# Patient Record
Sex: FEMALE
[De-identification: names, ages, dates, MRNs, and addresses within clinical notes are randomized per-mention and may not be internally consistent; named-entity substitution may affect disease eponyms.]

---

## 2023-06-05 PROBLEM — Z00.00 ENCOUNTER FOR PREVENTIVE HEALTH EXAMINATION: Status: ACTIVE | Noted: 2023-06-05

## 2023-06-13 ENCOUNTER — APPOINTMENT (OUTPATIENT)
Dept: ENDOCRINOLOGY | Facility: CLINIC | Age: 68
End: 2023-06-13
Payer: COMMERCIAL

## 2023-06-13 VITALS
HEIGHT: 66.5 IN | BODY MASS INDEX: 27.16 KG/M2 | DIASTOLIC BLOOD PRESSURE: 72 MMHG | SYSTOLIC BLOOD PRESSURE: 118 MMHG | HEART RATE: 83 BPM | WEIGHT: 171 LBS

## 2023-06-13 DIAGNOSIS — Z98.84 BARIATRIC SURGERY STATUS: ICD-10-CM

## 2023-06-13 DIAGNOSIS — I10 ESSENTIAL (PRIMARY) HYPERTENSION: ICD-10-CM

## 2023-06-13 DIAGNOSIS — E66.3 OVERWEIGHT: ICD-10-CM

## 2023-06-13 PROCEDURE — 99204 OFFICE O/P NEW MOD 45 MIN: CPT

## 2023-06-13 RX ORDER — LISINOPRIL AND HYDROCHLOROTHIAZIDE TABLETS 20; 25 MG/1; MG/1
20-25 TABLET ORAL
Refills: 0 | Status: ACTIVE | COMMUNITY
Start: 2023-06-13

## 2023-06-13 RX ORDER — LEVOTHYROXINE SODIUM 100 UG/1
100 TABLET ORAL
Qty: 90 | Refills: 3 | Status: ACTIVE | COMMUNITY
Start: 2023-06-13 | End: 1900-01-01

## 2023-06-13 RX ORDER — SEMAGLUTIDE 0.25 MG/.5ML
0.25 INJECTION, SOLUTION SUBCUTANEOUS
Qty: 1 | Refills: 0 | Status: ACTIVE | COMMUNITY
Start: 2023-06-13 | End: 1900-01-01

## 2023-06-13 NOTE — ASSESSMENT
[FreeTextEntry1] : 1. Hypothyroidism\par 2/2 Hashimotos, per patient\par Current thyroid regimen: Synthroid (brand) 100mcg QAM\par Most recent TFTs with PCP reveal TSH at goal of 1.44 with mildly elevated FT4 (2.02)\par Has been on current dose for ~3 years\par Discussed that mild T4 elevation can be due to her adherence to her Synthroid regimen, will continue current dose at this time, but plan to repeat TFTs in 3 months, especially as weight loss/maintenance of weight loss is desires and weight-based nature of LT4 supplementation.\par -- continue Synthroid 100mcg QAM\par -- repeat TFTs in 3 months, especially if resuming GLP1ra\par \par 2. Obesity\par Currently BMI is in overweight category at 27.19\par H/o surgical management with bariatric surgery in 2003 with resultant loss of 120lb.  Per patient starting weight 300lb.\par H/o multiple agents for medical management of obesity (as above), most recently, wegovy, which she tolerated well with therapeutic benefit.  Since discontinuation, has regained 10lbs.\par + obesity related comorbidity of hypertension\par She is fearful to continue to gain weight and would prefer to resume wegovy to achieve ~10lb further weight loss and to maintain her weight loss that she has achieved over the years\par -- RX for wegovy sent to meds, discussed shortage.  She is open to trial of saxenda, if wegovy unable to be obtained\par \par Follow-up in 3 months, if resuming GLP1ra, labs with this visit\par \par Debbi Jim, MS, FNP-BC, [facility]\par 06/13/2023

## 2023-06-13 NOTE — HISTORY OF PRESENT ILLNESS
[FreeTextEntry1] : Ms. GONSALEZ is a 67 year female with pmhx of hypothyroidism (dx in 1998), obesity (s/p gastric bypass in 2003) who presents for thyroid and weight management evaluation.\par \par Previously following Endocrinologist at [facility] x 30year, most recently seeing Dr. Burnham.\par Diagnosed with /hypothyroidism in 1998, endorses +hashimotos as etiology\par \par \par Current regimen: Synthroid (brand) 100 mcg daily, which she has been on this regimen x~3 years\par -- She takes the medication on an empty stomach - 1 hour apart from other food/medications.\par \par \par Denies fatigue\par Endorses +weight gain of 10lb since stopping wegovy in early 2023\par Denies constipation, diarrhea\par Denies hair loss\par Denies SOB on exertion\par Denies palpitation\par \par + family history of thyroid disease (mother, maternal aunt, maternal cousin)\par Denies a personal history of radiation exposure\par \par \par 1. Obesity\par Current BMI is 27.19\par Highest weight, pre-bariatric surgery was 300lb, BMI 47.7\par Lost 120lb with bariatric surgery (2003)\par Was medically managing obesity with past endocrinologist\par She has obesity related comorbidity of hypertension.\par Previously on wegovy regimen, stopped r/t last endocrinologist no longer being in-network for patients insurance\par Lose weight, lowest 161 (achieved while on wegovy), has regained 10lb since stopping this regimen 6 months ago\par Was on for short duration of time, 3 months, got to 1/1.7mg regimen\par \par Past pharmacologic therapy:\par - belviq, d/c with recall\par - wegovy \par - Xenical, GI distress\par \par H/o surgical management (gastric bypass) in 2003. \par Max weight 300lb, lost 120lb

## 2023-06-13 NOTE — PHYSICAL EXAM
[Alert] : alert [No Acute Distress] : no acute distress [Normal Voice/Communication] : normal voice communication [Normal Hearing] : hearing was normal [No Neck Mass] : no neck mass was observed [No LAD] : no lymphadenopathy [Thyroid Not Enlarged] : the thyroid was not enlarged [No Thyroid Nodules] : no palpable thyroid nodules [No Respiratory Distress] : no respiratory distress [Normal Rate and Effort] : normal respiratory rate and effort [Clear to Auscultation] : lungs were clear to auscultation bilaterally [Normal Rate] : heart rate was normal [Regular Rhythm] : with a regular rhythm [No Stigmata of Cushings Syndrome] : no stigmata of Cushings Syndrome [Normal Gait] : normal gait [Oriented x3] : oriented to person, place, and time [Normal Affect] : the affect was normal [Normal Insight/Judgement] : insight and judgment were intact [Normal Mood] : the mood was normal

## 2023-06-13 NOTE — DATA REVIEWED
[FreeTextEntry1] : 6/2/2023\par Glu 87, creat 0.8, eGFR 81.7, AST/ALT 30/30\par Chol 181 , HDL 87, LDL 85, TG 97\par WBC 6.1, RBC 4.03, H&H 10.3/34.3, plt 299\par TSH 1.44, FT4 2.02, TT4 12.9\par A1C 5.5%

## 2023-06-14 RX ORDER — LIRAGLUTIDE 6 MG/ML
18 INJECTION, SOLUTION SUBCUTANEOUS
Qty: 3 | Refills: 1 | Status: ACTIVE | COMMUNITY
Start: 2023-06-14 | End: 1900-01-01

## 2023-06-29 ENCOUNTER — NON-APPOINTMENT (OUTPATIENT)
Age: 68
End: 2023-06-29

## 2023-06-29 RX ORDER — PEN NEEDLE, DIABETIC 32GX 5/32"
32G X 4 MM NEEDLE, DISPOSABLE MISCELLANEOUS
Qty: 100 | Refills: 0 | Status: ACTIVE | COMMUNITY
Start: 2023-06-29 | End: 1900-01-01

## 2024-03-14 DIAGNOSIS — Z13.1 ENCOUNTER FOR SCREENING FOR DIABETES MELLITUS: ICD-10-CM

## 2024-03-14 DIAGNOSIS — E06.3 AUTOIMMUNE THYROIDITIS: ICD-10-CM

## 2024-03-14 DIAGNOSIS — Z13.220 ENCOUNTER FOR SCREENING FOR LIPOID DISORDERS: ICD-10-CM

## 2024-05-17 ENCOUNTER — APPOINTMENT (OUTPATIENT)
Dept: ENDOCRINOLOGY | Facility: CLINIC | Age: 69
End: 2024-05-17

## 2024-07-08 ENCOUNTER — APPOINTMENT (OUTPATIENT)
Dept: ENDOCRINOLOGY | Facility: CLINIC | Age: 69
End: 2024-07-08

## 2025-02-24 ENCOUNTER — EMERGENCY (EMERGENCY)
Facility: HOSPITAL | Age: 70
LOS: 1 days | Discharge: HOME | End: 2025-02-24
Admitting: EMERGENCY MEDICINE
Payer: COMMERCIAL

## 2025-02-24 VITALS
RESPIRATION RATE: 17 BRPM | SYSTOLIC BLOOD PRESSURE: 117 MMHG | WEIGHT: 171.08 LBS | DIASTOLIC BLOOD PRESSURE: 74 MMHG | HEART RATE: 74 BPM | TEMPERATURE: 98 F | OXYGEN SATURATION: 98 %

## 2025-02-24 VITALS
RESPIRATION RATE: 16 BRPM | TEMPERATURE: 98 F | DIASTOLIC BLOOD PRESSURE: 67 MMHG | HEART RATE: 73 BPM | SYSTOLIC BLOOD PRESSURE: 142 MMHG | OXYGEN SATURATION: 100 %

## 2025-02-24 LAB
ALBUMIN SERPL ELPH-MCNC: 3.8 G/DL — SIGNIFICANT CHANGE UP (ref 3.3–5)
ALP SERPL-CCNC: 122 U/L — HIGH (ref 40–120)
ALT FLD-CCNC: 20 U/L — SIGNIFICANT CHANGE UP (ref 4–33)
ANION GAP SERPL CALC-SCNC: 13 MMOL/L — SIGNIFICANT CHANGE UP (ref 7–14)
AST SERPL-CCNC: 25 U/L — SIGNIFICANT CHANGE UP (ref 4–32)
BASOPHILS # BLD AUTO: 0.05 K/UL — SIGNIFICANT CHANGE UP (ref 0–0.2)
BASOPHILS NFR BLD AUTO: 0.6 % — SIGNIFICANT CHANGE UP (ref 0–2)
BILIRUB SERPL-MCNC: <0.2 MG/DL — SIGNIFICANT CHANGE UP (ref 0.2–1.2)
BUN SERPL-MCNC: 28 MG/DL — HIGH (ref 7–23)
CALCIUM SERPL-MCNC: 9.1 MG/DL — SIGNIFICANT CHANGE UP (ref 8.4–10.5)
CHLORIDE SERPL-SCNC: 106 MMOL/L — SIGNIFICANT CHANGE UP (ref 98–107)
CO2 SERPL-SCNC: 23 MMOL/L — SIGNIFICANT CHANGE UP (ref 22–31)
CREAT SERPL-MCNC: 0.7 MG/DL — SIGNIFICANT CHANGE UP (ref 0.5–1.3)
EGFR: 95 ML/MIN/1.73M2 — SIGNIFICANT CHANGE UP
EOSINOPHIL # BLD AUTO: 0.19 K/UL — SIGNIFICANT CHANGE UP (ref 0–0.5)
EOSINOPHIL NFR BLD AUTO: 2.1 % — SIGNIFICANT CHANGE UP (ref 0–6)
GLUCOSE SERPL-MCNC: 96 MG/DL — SIGNIFICANT CHANGE UP (ref 70–99)
HCT VFR BLD CALC: 40.9 % — SIGNIFICANT CHANGE UP (ref 34.5–45)
HGB BLD-MCNC: 13.2 G/DL — SIGNIFICANT CHANGE UP (ref 11.5–15.5)
IANC: 6.54 K/UL — SIGNIFICANT CHANGE UP (ref 1.8–7.4)
IMM GRANULOCYTES NFR BLD AUTO: 0.3 % — SIGNIFICANT CHANGE UP (ref 0–0.9)
LYMPHOCYTES # BLD AUTO: 1.51 K/UL — SIGNIFICANT CHANGE UP (ref 1–3.3)
LYMPHOCYTES # BLD AUTO: 16.9 % — SIGNIFICANT CHANGE UP (ref 13–44)
MCHC RBC-ENTMCNC: 30.1 PG — SIGNIFICANT CHANGE UP (ref 27–34)
MCHC RBC-ENTMCNC: 32.3 G/DL — SIGNIFICANT CHANGE UP (ref 32–36)
MCV RBC AUTO: 93.2 FL — SIGNIFICANT CHANGE UP (ref 80–100)
MONOCYTES # BLD AUTO: 0.6 K/UL — SIGNIFICANT CHANGE UP (ref 0–0.9)
MONOCYTES NFR BLD AUTO: 6.7 % — SIGNIFICANT CHANGE UP (ref 2–14)
NEUTROPHILS # BLD AUTO: 6.54 K/UL — SIGNIFICANT CHANGE UP (ref 1.8–7.4)
NEUTROPHILS NFR BLD AUTO: 73.4 % — SIGNIFICANT CHANGE UP (ref 43–77)
NRBC # BLD AUTO: 0 K/UL — SIGNIFICANT CHANGE UP (ref 0–0)
NRBC # FLD: 0 K/UL — SIGNIFICANT CHANGE UP (ref 0–0)
NRBC BLD AUTO-RTO: 0 /100 WBCS — SIGNIFICANT CHANGE UP (ref 0–0)
PLATELET # BLD AUTO: 218 K/UL — SIGNIFICANT CHANGE UP (ref 150–400)
POTASSIUM SERPL-MCNC: 4.8 MMOL/L — SIGNIFICANT CHANGE UP (ref 3.5–5.3)
POTASSIUM SERPL-SCNC: 4.8 MMOL/L — SIGNIFICANT CHANGE UP (ref 3.5–5.3)
PROT SERPL-MCNC: 7.1 G/DL — SIGNIFICANT CHANGE UP (ref 6–8.3)
RBC # BLD: 4.39 M/UL — SIGNIFICANT CHANGE UP (ref 3.8–5.2)
RBC # FLD: 12.7 % — SIGNIFICANT CHANGE UP (ref 10.3–14.5)
SODIUM SERPL-SCNC: 142 MMOL/L — SIGNIFICANT CHANGE UP (ref 135–145)
WBC # BLD: 8.92 K/UL — SIGNIFICANT CHANGE UP (ref 3.8–10.5)
WBC # FLD AUTO: 8.92 K/UL — SIGNIFICANT CHANGE UP (ref 3.8–10.5)

## 2025-02-24 RX ORDER — AMOXICILLIN AND CLAVULANATE POTASSIUM 200; 28.5 MG/5ML; MG/5ML
1 POWDER, FOR SUSPENSION ORAL
Qty: 13 | Refills: 0
Start: 2025-02-24 | End: 2025-03-02

## 2025-02-24 RX ORDER — KETOROLAC TROMETHAMINE 10 MG
15 TABLET ORAL ONCE
Refills: 0 | Status: DISCONTINUED | OUTPATIENT
Start: 2025-02-24 | End: 2025-02-24

## 2025-02-24 RX ORDER — AMPICILLIN SODIUM AND SULBACTAM SODIUM 2; 1 G/1; G/1
3 INJECTION, POWDER, FOR SOLUTION INTRAMUSCULAR; INTRAVENOUS ONCE
Refills: 0 | Status: COMPLETED | OUTPATIENT
Start: 2025-02-24 | End: 2025-02-24

## 2025-02-24 RX ORDER — BACTERIOSTATIC SODIUM CHLORIDE 0.9 %
1000 VIAL (ML) INJECTION ONCE
Refills: 0 | Status: COMPLETED | OUTPATIENT
Start: 2025-02-24 | End: 2025-02-24

## 2025-02-24 RX ADMIN — Medication 1000 MILLILITER(S): at 17:59

## 2025-02-24 RX ADMIN — Medication 15 MILLIGRAM(S): at 17:59

## 2025-02-24 RX ADMIN — AMPICILLIN SODIUM AND SULBACTAM SODIUM 200 GRAM(S): 2; 1 INJECTION, POWDER, FOR SOLUTION INTRAMUSCULAR; INTRAVENOUS at 20:44

## 2025-02-24 NOTE — ED PROVIDER NOTE - OBJECTIVE STATEMENT
69yoF PMH HTN, p/w L neck pain/swelling since last night. + redness. no fevers/recent illness, cough, congestion, sore throat. does endorse recent dental procedure last week, root canal, although denies any pain. does endorse not drinking as much water this week after procedure. Pt endorses some pain w/ chewing and redness prompting ED eval. pt has had this in past. pt denies cp, sob, n/v/d/c, abd pain.  of note, pt recently hurt her foot 3 weeks ago, dropped a box on her foot, still has pain, did not seek eval at the time.

## 2025-02-24 NOTE — ED PROVIDER NOTE - NSFOLLOWUPINSTRUCTIONS_ED_ALL_ED_FT
Sialoadenitis    WHAT YOU NEED TO KNOW:    Sialoadenitis is an inflammation or infection of one or more of your salivary glands. A small stone can block the salivary gland and cause inflammation. Infection may be caused by a virus or bacteria. You can develop sialoadenitis on one or both sides of your face.    DISCHARGE INSTRUCTIONS:    Return to the emergency department if:    You have trouble breathing or swallowing because of swelling.    Call your doctor if:    You have trouble opening your mouth because of swelling.    Your salivary gland gets more red and hot or drains more pus.    The pain and swelling do not go away within 2 days, or they get worse.    Your mouth is very dry.    You lose movement on one side of your face.    You have questions about your condition or care.  Medicines: You may need one or more of the following:    Antibiotics may be given to treat a bacterial infection.    Acetaminophen decreases pain and fever. It is available without a doctor's order. Ask how much to give your child and how often to give it. Follow directions. Read the labels of all other medicines your child uses to see if they also contain acetaminophen, or ask your child's doctor or pharmacist. Acetaminophen can cause liver damage if not taken correctly.    NSAIDs, such as ibuprofen, help decrease swelling, pain, and fever. This medicine is available with or without a doctor's order. NSAIDs can cause stomach bleeding or kidney problems in certain people. If you take blood thinner medicine, always ask your healthcare provider if NSAIDs are safe for you. Always read the medicine label and follow directions.    Take your medicine as directed. Contact your healthcare provider if you think your medicine is not helping or if you have side effects. Tell your provider if you are allergic to any medicine. Keep a list of the medicines, vitamins, and herbs you take. Include the amounts, and when and why you take them. Bring the list or the pill bottles to follow-up visits. Carry your medicine list with you in case of an emergency.  Manage or prevent sialoadenitis:    Drink liquids as directed. You may need to drink more liquids than usual. Ask how much liquid to drink each day and which liquids are best for you. Good choices of liquids for most people include water, tea, soup, juice, or milk.    Practice good oral care. Brush your teeth 2 times a day, 1 time in the morning and 1 time in the evening. Use a fluoride toothpaste. Floss your teeth 1 time each day, usually in the evening. Use mouthwash after you floss. Swish it around in your mouth for 30 seconds and spit it out.    Keep your mouth moist. Suck on hard candy or chew sugarless gum to get your saliva flowing. Sour and tart flavors such as lemon and orange will help get saliva to flow. This will help keep your mouth moist and help push out a stone blocking your salivary duct.    Apply a warm, wet cloth and massage the swollen area as directed. This may help relieve swelling and pain by pushing the pus out of the gland.  Follow up with your doctor or dentist as directed: Write down your questions so you remember to ask them during your visits.

## 2025-02-24 NOTE — ED PROVIDER NOTE - PHYSICAL EXAMINATION
CONSTITUTIONAL: Well-appearing; well-nourished; in no apparent distress;  HEAD: Normocephalic, atraumatic;  EYES: PERRL, EOM intact, conjunctiva and sclera WNL;  ENT: normal nose; no rhinorrhea; unremarkable pharynx.   NECK/LYMPH: Supple; non-tender;+ L sided parotid gland swelling noted, firm, erythematous, ttp. no trismus  CARD: Normal S1, S2; no murmurs, rubs, or gallops noted  RESP: Normal chest excursion with respiration; breath sounds clear and equal bilaterally; no wheezes, rhonchi, or rales noted  EXT/MS: no visible deformity. + ttp along 4th and 5th MTP of L foot. moves all extremities; distal pulses are normal, no pedal edema  SKIN: Normal for age and race; warm; dry; good turgor; no apparent lesions or exudate noted  NEURO: Awake, alert, oriented x 3, no gross deficits, CN II-XII grossly intact, no motor or sensory deficit noted  PSYCH: Normal mood; appropriate affect

## 2025-02-24 NOTE — ED PROVIDER NOTE - CLINICAL SUMMARY MEDICAL DECISION MAKING FREE TEXT BOX
69yoF PMH HTN, p/w L neck pain/swelling since last night. + redness. no fevers/recent illness, cough, congestion, sore throat. does endorse recent dental procedure last week, root canal, although denies any pain. does endorse not drinking as much water this week after procedure. Pt endorses some pain w/ chewing and redness prompting ED eval.     will scan given rapid onset and swelling/ erythema to r/o parotid gland abscess  will check basic labs  give fluids/toradol  likely sialadenitis, parotitis  xray foot to r/o fracturea  reassess 69yoF PMH HTN, p/w L neck pain/swelling since last night. + redness. no fevers/recent illness, cough, congestion, sore throat. does endorse recent dental procedure last week, root canal, although denies any pain. does endorse not drinking as much water this week after procedure. Pt endorses some pain w/ chewing and redness prompting ED eval.     will scan given rapid onset and swelling/ erythema to r/o parotid gland abscess  will check basic labs  give fluids/toradol  likely sialadenitis, parotitis  xray foot to r/o fracture  reassess

## 2025-02-24 NOTE — ED ADULT NURSE NOTE - OBJECTIVE STATEMENT
Patient is awake ane alert, her  is at her bedside. Patient c/o left sided swelling , redness and pain below her left ear area. IV placed, labs sent , awaiting ct scan.

## 2025-02-24 NOTE — ED PROVIDER NOTE - PROGRESS NOTE DETAILS
VANDANA Workman: Received signout from VANDANA Charles.  Patient seen resting comfortably in no acute distress.  Labs acutely unremarkable.  X-ray of foot with no acute fractures or dislocation.  Max face significant for left parotitis associated with subcutaneous soft tissue swelling.  Patient given a dose of IV Unasyn.  Patient clinically stable tolerating p.o.  Patient given IV Unasyn and will send Augmentin to the pharmacy.  Strict return precautions given upon discharge.  Labs and imaging discussed and reviewed with patient.

## 2025-02-24 NOTE — ED PROVIDER NOTE - PATIENT PORTAL LINK FT
You can access the FollowMyHealth Patient Portal offered by [facility] by registering at the following website: [email or web address]. By joining [employer]’s FollowMyHealth portal, you will also be able to view your health information using other applications (apps) compatible with our system.

## 2025-02-24 NOTE — ED ADULT TRIAGE NOTE - CHIEF COMPLAINT QUOTE
Pt presenting with pain swelling and redness to left side of neck noticed last night. No fevers, Airway patent. Past hx HTN